# Patient Record
Sex: FEMALE | Race: WHITE | Employment: UNEMPLOYED | URBAN - METROPOLITAN AREA
[De-identification: names, ages, dates, MRNs, and addresses within clinical notes are randomized per-mention and may not be internally consistent; named-entity substitution may affect disease eponyms.]

---

## 2017-01-04 PROCEDURE — 86747 PARVOVIRUS ANTIBODY: CPT | Performed by: INTERNAL MEDICINE

## 2017-01-04 PROCEDURE — 86403 PARTICLE AGGLUT ANTBDY SCRN: CPT | Performed by: INTERNAL MEDICINE

## 2017-01-31 ENCOUNTER — HOSPITAL ENCOUNTER (OUTPATIENT)
Dept: MAMMOGRAPHY | Age: 42
Discharge: HOME | End: 2017-01-31
Attending: OBSTETRICS & GYNECOLOGY
Payer: COMMERCIAL

## 2017-01-31 DIAGNOSIS — Z12.39 BREAST SCREENING: ICD-10-CM

## 2017-01-31 PROCEDURE — 77067 SCR MAMMO BI INCL CAD: CPT

## 2017-02-21 ENCOUNTER — HOSPITAL ENCOUNTER (OUTPATIENT)
Dept: GENERAL RADIOLOGY | Age: 42
Discharge: HOME | End: 2017-02-21
Attending: INTERNAL MEDICINE
Payer: COMMERCIAL

## 2017-02-21 DIAGNOSIS — S09.90XA HEAD, FACE & NECK INJURY, INITIAL ENCOUNTER: ICD-10-CM

## 2017-02-21 DIAGNOSIS — S19.9XXA HEAD, FACE & NECK INJURY, INITIAL ENCOUNTER: ICD-10-CM

## 2017-02-21 DIAGNOSIS — S09.93XA HEAD, FACE & NECK INJURY, INITIAL ENCOUNTER: ICD-10-CM

## 2017-02-21 PROCEDURE — 70150 X-RAY EXAM OF FACIAL BONES: CPT

## 2017-03-07 PROBLEM — R11.0 NAUSEA: Status: ACTIVE | Noted: 2017-03-07

## 2017-03-07 PROBLEM — F07.81 POST CONCUSSION SYNDROME: Status: ACTIVE | Noted: 2017-03-07

## 2017-03-07 PROBLEM — H53.8 BLURRED VISION, BILATERAL: Status: ACTIVE | Noted: 2017-03-07

## 2017-06-15 ENCOUNTER — TELEPHONE (OUTPATIENT)
Dept: OBGYN CLINIC | Facility: CLINIC | Age: 42
End: 2017-06-15

## 2017-06-15 NOTE — TELEPHONE ENCOUNTER
Patient was last seen 11/02/2016. She called c/o vaginal itching, redness, and irritation. Denied any burning, frequency, or urgency with urination. She stated she had \"normal\" white discharge. She was using Zinc Oxide Cream for itching.   Denies any

## 2017-06-15 NOTE — TELEPHONE ENCOUNTER
Patient called, for about 5 days now she is having vaginal itching, redness around it and irritation. Wants to know if Dr. Lorraine Quarles can prescribe something for her or does she needs to come in.   Please advise

## 2017-09-18 PROCEDURE — 82533 TOTAL CORTISOL: CPT | Performed by: INTERNAL MEDICINE

## 2017-10-02 ENCOUNTER — TELEPHONE (OUTPATIENT)
Dept: OBGYN CLINIC | Facility: CLINIC | Age: 42
End: 2017-10-02

## 2017-10-02 RX ORDER — NORGESTIMATE AND ETHINYL ESTRADIOL
1 KIT DAILY
Qty: 84 TABLET | Refills: 0 | Status: SHIPPED | OUTPATIENT
Start: 2017-10-02 | End: 2017-10-06

## 2017-10-02 NOTE — TELEPHONE ENCOUNTER
Please refill the [facility] RX through [facility]  Please call PT when refill has been generated  PT has physical scheduled in November

## 2017-10-02 NOTE — TELEPHONE ENCOUNTER
Medication sent to pharmacy. Left detailed message via voicemail informing patient. To call back with any questions or concerns.

## 2017-10-06 ENCOUNTER — TELEPHONE (OUTPATIENT)
Dept: OBGYN CLINIC | Facility: CLINIC | Age: 42
End: 2017-10-06

## 2017-10-06 RX ORDER — NORGESTIMATE AND ETHINYL ESTRADIOL
1 KIT DAILY
Qty: 84 TABLET | Refills: 0 | Status: SHIPPED | OUTPATIENT
Start: 2017-10-06 | End: 2017-10-13

## 2017-10-06 NOTE — TELEPHONE ENCOUNTER
PT is calling and said she never received a call about the prescription being sent. And now states that she doesn't know where the medication was sent to. PT would like a call back from the RN and can be reached on her cell phone.

## 2017-10-06 NOTE — TELEPHONE ENCOUNTER
Patient stated that pharmacy never received her medication. Re-sent. To call with any further questions or concerns.

## 2017-10-12 ENCOUNTER — HOSPITAL ENCOUNTER (OUTPATIENT)
Dept: GENERAL RADIOLOGY | Age: 42
Discharge: HOME | End: 2017-10-12
Attending: INTERNAL MEDICINE
Payer: COMMERCIAL

## 2017-10-12 DIAGNOSIS — M25.561 CHRONIC PAIN OF RIGHT KNEE: ICD-10-CM

## 2017-10-12 DIAGNOSIS — G89.29 CHRONIC PAIN OF RIGHT KNEE: ICD-10-CM

## 2017-10-12 PROCEDURE — 73564 X-RAY EXAM KNEE 4 OR MORE: CPT | Performed by: INTERNAL MEDICINE

## 2017-10-13 ENCOUNTER — TELEPHONE (OUTPATIENT)
Dept: OBGYN CLINIC | Facility: CLINIC | Age: 42
End: 2017-10-13

## 2017-10-13 RX ORDER — NORGESTIMATE AND ETHINYL ESTRADIOL
1 KIT DAILY
Qty: 28 TABLET | Refills: 0 | Status: SHIPPED | OUTPATIENT
Start: 2017-10-13 | End: 2017-11-28

## 2017-10-13 RX ORDER — NORGESTIMATE AND ETHINYL ESTRADIOL
1 KIT DAILY
Qty: 28 TABLET | Refills: 0 | Status: SHIPPED | OUTPATIENT
Start: 2017-10-13 | End: 2017-10-13

## 2017-10-13 NOTE — TELEPHONE ENCOUNTER
PT appointment [facility] to  11/28  PT requesting one more refill prior to PT's appointment  Please call PT to confirm

## 2017-10-13 NOTE — TELEPHONE ENCOUNTER
Patient needs a call right back concerning TRI-LO-SPRINTEC 0.18/0.215/0.25 MG-25 MCG Oral Tab prescription.  Needs today

## 2017-10-13 NOTE — TELEPHONE ENCOUNTER
One month refill sent to [facility] per patient request.  Pending appt 11/9/17.

## 2017-10-29 ENCOUNTER — OFFICE VISIT (OUTPATIENT)
Dept: FAMILY MEDICINE CLINIC | Facility: CLINIC | Age: 42
End: 2017-10-29

## 2017-10-29 VITALS
RESPIRATION RATE: 18 BRPM | SYSTOLIC BLOOD PRESSURE: 110 MMHG | WEIGHT: 171 LBS | OXYGEN SATURATION: 98 % | TEMPERATURE: 98 F | HEART RATE: 68 BPM | BODY MASS INDEX: 27 KG/M2 | DIASTOLIC BLOOD PRESSURE: 60 MMHG

## 2017-10-29 DIAGNOSIS — B02.9 HERPES ZOSTER WITHOUT COMPLICATION: Primary | ICD-10-CM

## 2017-10-29 PROCEDURE — 99213 OFFICE O/P EST LOW 20 MIN: CPT | Performed by: NURSE PRACTITIONER

## 2017-10-29 RX ORDER — VALACYCLOVIR HYDROCHLORIDE 1 G/1
1 TABLET, FILM COATED ORAL EVERY 8 HOURS
Qty: 21 TABLET | Refills: 0 | Status: SHIPPED | OUTPATIENT
Start: 2017-10-29 | End: 2017-11-05

## 2017-10-29 NOTE — PROGRESS NOTES
CHIEF COMPLAINT:   Patient presents with:  Rash: pt c\o of rash, on abdomen,          HPI:   Shirlene Walker is a 38year old female who presents for evaluation of a rash. Per patient rash started in the past 2 days to right side of abdomen.    Rash has bee Comment: Miguel Angel; bruce 5 yrs  No date: D & C  2010: D&C AFTER DELIVERY      Comment: MISCARRIAGE  No date:   4/10/15: UPPER GI ENDOSCOPY PERFORMED      Comment: Lizeth Noland   Family History   Problem Relation Age of Onset   • Hypertension Mother breath sounds. No increased work of breathing. CARDIO: RRR without murmur  LYMPH: No cervical lymphadenopathy. ASSESSMENT AND PLAN:   Siva Forde is a 38year old female who presents for evaluation of a rash.  Findings are consistent with:    ASSES

## 2017-10-29 NOTE — PATIENT INSTRUCTIONS
Shingles  Shingles is a viral infection caused by the same virus as chicken pox. Anyone who has had chicken pox may get shingles later in life. The virus stays in the body, but remains dormant (asleep).  Shingles often occurs in older persons or persons w · Trim fingernails and try not to scratch. Scratching the sores may leave scars. · Stay home from work or school until all blisters have formed a crust and you are no longer contagious.   Follow-up care  Follow up with your healthcare provider or as direct

## 2017-11-20 RX ORDER — NORGESTIMATE AND ETHINYL ESTRADIOL 7DAYSX3 LO
1 KIT ORAL DAILY
Qty: 28 TABLET | Refills: 0 | OUTPATIENT
Start: 2017-11-20

## 2017-11-28 ENCOUNTER — OFFICE VISIT (OUTPATIENT)
Dept: OBGYN CLINIC | Facility: CLINIC | Age: 42
End: 2017-11-28

## 2017-11-28 VITALS
HEART RATE: 82 BPM | SYSTOLIC BLOOD PRESSURE: 110 MMHG | WEIGHT: 175 LBS | BODY MASS INDEX: 27.79 KG/M2 | HEIGHT: 66.5 IN | DIASTOLIC BLOOD PRESSURE: 82 MMHG

## 2017-11-28 DIAGNOSIS — R68.82 DECREASED LIBIDO: ICD-10-CM

## 2017-11-28 DIAGNOSIS — Z12.31 SCREENING MAMMOGRAM, ENCOUNTER FOR: ICD-10-CM

## 2017-11-28 DIAGNOSIS — Z01.419 ENCOUNTER FOR GYNECOLOGICAL EXAMINATION WITHOUT ABNORMAL FINDING: Primary | ICD-10-CM

## 2017-11-28 PROCEDURE — 99396 PREV VISIT EST AGE 40-64: CPT | Performed by: OBSTETRICS & GYNECOLOGY

## 2017-11-28 RX ORDER — NORETHINDRONE ACETATE AND ETHINYL ESTRADIOL 1; .02 MG/1; MG/1
1 TABLET ORAL DAILY
Qty: 3 PACKAGE | Refills: 3 | Status: SHIPPED | OUTPATIENT
Start: 2017-11-28 | End: 2018-01-30

## 2017-11-28 NOTE — PROGRESS NOTES
Patient ID:   Bi Fine  is a 38year old female         HPI:     Here for general gyn exam. Last seen 2016. New medical/surgical hx:  None. Had concussion due to fall in 2016 and took months to recover from.       Patient's la Comment: MISCARRIAGE  No date:   4/10/15: UPPER GI ENDOSCOPY PERFORMED      Comment: Miguel Angel    Current Outpatient Prescriptions on File Prior to Visit:  Vitamin C 500 MG Oral Tab Take 500 mg by mouth daily.  Disp:  Rfl:    Probiotic Product (PROBIO Visit:  Signed Prescriptions Disp Refills    Norethindrone Acet-Ethinyl Est (MICROGESTIN 1/20) 1-20 MG-MCG Oral Tab 3 Package 3      Sig: Take 1 tablet by mouth daily.            Imaging & Consults:  VELMA SCREENING BILMARY (CPT=77067)    NOTES:  Will change OC

## 2017-12-08 ENCOUNTER — TELEPHONE (OUTPATIENT)
Dept: OBGYN CLINIC | Facility: CLINIC | Age: 42
End: 2017-12-08

## 2018-01-29 ENCOUNTER — TELEPHONE (OUTPATIENT)
Dept: OBGYN CLINIC | Facility: CLINIC | Age: 43
End: 2018-01-29

## 2018-01-29 NOTE — TELEPHONE ENCOUNTER
Patient states that she was tying different birth control pills and now would like to switch back to the one she was first taking.  Please call her

## 2018-01-29 NOTE — TELEPHONE ENCOUNTER
Pt would like to go back to Tri Lo Sprintec. Pt has not noticed any change with Microgestin. Refills to be sent to [facility] on file. Routed to Dr. Abhijit Garcia. Please advise if OK to order Tri Lo Sprintec until annual due.

## 2018-01-30 RX ORDER — NORGESTIMATE AND ETHINYL ESTRADIOL 0.25-0.035
1 KIT ORAL DAILY
Qty: 3 PACKAGE | Refills: 3 | Status: SHIPPED | OUTPATIENT
Start: 2018-01-30 | End: 2018-05-23 | Stop reason: ALTCHOICE

## 2018-04-17 ENCOUNTER — HOSPITAL ENCOUNTER (OUTPATIENT)
Dept: MAMMOGRAPHY | Age: 43
Discharge: HOME | End: 2018-04-17
Attending: OBSTETRICS & GYNECOLOGY
Payer: COMMERCIAL

## 2018-04-17 DIAGNOSIS — Z12.31 SCREENING MAMMOGRAM, ENCOUNTER FOR: ICD-10-CM

## 2018-04-17 PROCEDURE — 77067 SCR MAMMO BI INCL CAD: CPT | Performed by: OBSTETRICS & GYNECOLOGY

## 2018-05-23 ENCOUNTER — OFFICE VISIT (OUTPATIENT)
Dept: FAMILY MEDICINE CLINIC | Facility: CLINIC | Age: 43
End: 2018-05-23

## 2018-05-23 VITALS
BODY MASS INDEX: 29.41 KG/M2 | TEMPERATURE: 98 F | HEIGHT: 66 IN | RESPIRATION RATE: 16 BRPM | HEART RATE: 94 BPM | DIASTOLIC BLOOD PRESSURE: 66 MMHG | WEIGHT: 183 LBS | SYSTOLIC BLOOD PRESSURE: 100 MMHG | OXYGEN SATURATION: 97 %

## 2018-05-23 DIAGNOSIS — J02.9 SORE THROAT: Primary | ICD-10-CM

## 2018-05-23 PROCEDURE — 99213 OFFICE O/P EST LOW 20 MIN: CPT | Performed by: PHYSICIAN ASSISTANT

## 2018-05-23 PROCEDURE — 87880 STREP A ASSAY W/OPTIC: CPT | Performed by: PHYSICIAN ASSISTANT

## 2018-05-23 NOTE — PROGRESS NOTES
CHIEF COMPLAINT:   Patient presents with:  Sore Throat: 3 days. HPI:   Dasia Worthy is a 38year old female presents to clinic with symptoms of sore throat for 3 days. Admits to runny nose. Ear pressure. Mild headache.  Low grade temp-100.2. +fatigue 16   Ht 66\"   Wt 183 lb   LMP 05/18/2018   SpO2 97%   BMI 29.54 kg/m²   GENERAL: well developed, well nourished,in no apparent distress  SKIN: no rashes,no suspicious lesions  HEAD: atraumatic, normocephalic  EYES: conjunctiva clear  EARS: TM's clear, non

## 2018-12-06 ENCOUNTER — OFFICE VISIT (OUTPATIENT)
Dept: FAMILY MEDICINE CLINIC | Facility: CLINIC | Age: 43
End: 2018-12-06
Payer: COMMERCIAL

## 2018-12-06 VITALS
RESPIRATION RATE: 18 BRPM | SYSTOLIC BLOOD PRESSURE: 106 MMHG | BODY MASS INDEX: 29.89 KG/M2 | OXYGEN SATURATION: 98 % | HEART RATE: 86 BPM | TEMPERATURE: 98 F | WEIGHT: 186 LBS | HEIGHT: 66 IN | DIASTOLIC BLOOD PRESSURE: 70 MMHG

## 2018-12-06 DIAGNOSIS — J02.9 SORE THROAT: ICD-10-CM

## 2018-12-06 DIAGNOSIS — J06.9 VIRAL UPPER RESPIRATORY TRACT INFECTION: Primary | ICD-10-CM

## 2018-12-06 PROCEDURE — 87880 STREP A ASSAY W/OPTIC: CPT | Performed by: NURSE PRACTITIONER

## 2018-12-06 PROCEDURE — 99213 OFFICE O/P EST LOW 20 MIN: CPT | Performed by: NURSE PRACTITIONER

## 2018-12-06 NOTE — PROGRESS NOTES
CHIEF COMPLAINT:   Patient presents with:  Cough: Burning cough, sore throat, post nasal drip X 6 days     HPI:   Rosi Cornelius is a 37year old female presents to clinic with complaint of sore throat. Patient has had for 6 days.  Patient reports followi GENERAL HEALTH: feels well otherwise, good appetite  SKIN: denies any unusual skin lesions or rashes  HEENT: denies ear pain, See HPI  RESPIRATORY: denies shortness of breath or wheezing  CARDIOVASCULAR: denies chest pain or palpitations   GI: denies vomit · Common colds are caused by viruses which are not eradicated with antibiotics  · Cold remedies are to relieve symptoms and prevent complications rather than cure infection  · Rest and increased oral fluid intake is advised  · Increase humidity of the air You have a viral upper respiratory illness (URI), which is another term for the common cold. This illness is contagious during the first few days. It is spread through the air by coughing and sneezing.  It may also be spread by direct contact (touching the · Cough with lots of colored sputum (mucus)  · Severe headache; face, neck, or ear pain  · Difficulty swallowing due to throat pain  · Fever of 100.4°F (38°C) or higher, or as directed by your healthcare provider  Call 911  Call 911 if any of these occur: · For sore throats caused by allergies, try antihistamines to block the allergic reaction. · Remember: unless a sore throat is caused by a bacterial infection, antibiotics won’t help you.   Prevent future sore throats  Prevention tips include the following

## 2019-01-14 ENCOUNTER — OFFICE VISIT (OUTPATIENT)
Dept: OBGYN CLINIC | Facility: CLINIC | Age: 44
End: 2019-01-14
Payer: COMMERCIAL

## 2019-01-14 VITALS
BODY MASS INDEX: 29.16 KG/M2 | HEART RATE: 83 BPM | DIASTOLIC BLOOD PRESSURE: 70 MMHG | WEIGHT: 183.63 LBS | HEIGHT: 66.5 IN | SYSTOLIC BLOOD PRESSURE: 96 MMHG

## 2019-01-14 DIAGNOSIS — Z12.4 PAPANICOLAOU SMEAR FOR CERVICAL CANCER SCREENING: ICD-10-CM

## 2019-01-14 DIAGNOSIS — Z12.31 SCREENING MAMMOGRAM, ENCOUNTER FOR: ICD-10-CM

## 2019-01-14 DIAGNOSIS — Z01.419 ENCOUNTER FOR GYNECOLOGICAL EXAMINATION WITHOUT ABNORMAL FINDING: Primary | ICD-10-CM

## 2019-01-14 DIAGNOSIS — Z80.3 FAMILY HISTORY OF BREAST CANCER IN FIRST DEGREE RELATIVE: ICD-10-CM

## 2019-01-14 PROCEDURE — 99396 PREV VISIT EST AGE 40-64: CPT | Performed by: OBSTETRICS & GYNECOLOGY

## 2019-01-14 PROCEDURE — 88175 CYTOPATH C/V AUTO FLUID REDO: CPT | Performed by: OBSTETRICS & GYNECOLOGY

## 2019-01-14 NOTE — PROGRESS NOTES
Patient ID:   Obdulio Bonner  is a 37year old female         HPI:     Here for general gyn exam. Last seen 2017. New medical/surgical hx:  None. Patient's last menstrual period was 2018 (approximate). Menses:   Hx Prior Abnormal per nostril daily as needed Disp:  Rfl: 0   Loratadine (CLARITIN) 10 MG Oral Cap Take  by mouth.  Disp:  Rfl:    Cholecalciferol (VITAMIN D) 2000 UNITS Oral Cap 1 CAPSULE DAILY Disp:  Rfl:    Multiple Vitamin (DAILY MULTIVITAMIN OR) daily Disp:  Rfl:    Ome

## 2019-01-31 ENCOUNTER — OFFICE VISIT (OUTPATIENT)
Dept: FAMILY MEDICINE CLINIC | Facility: CLINIC | Age: 44
End: 2019-01-31
Payer: COMMERCIAL

## 2019-01-31 VITALS
BODY MASS INDEX: 28.88 KG/M2 | HEIGHT: 67 IN | OXYGEN SATURATION: 98 % | HEART RATE: 88 BPM | RESPIRATION RATE: 16 BRPM | TEMPERATURE: 98 F | SYSTOLIC BLOOD PRESSURE: 118 MMHG | DIASTOLIC BLOOD PRESSURE: 76 MMHG | WEIGHT: 184 LBS

## 2019-01-31 DIAGNOSIS — N30.01 ACUTE CYSTITIS WITH HEMATURIA: ICD-10-CM

## 2019-01-31 DIAGNOSIS — R39.9 UTI SYMPTOMS: Primary | ICD-10-CM

## 2019-01-31 LAB
BILIRUBIN: NEGATIVE
GLUCOSE (URINE DIPSTICK): NEGATIVE MG/DL
KETONES (URINE DIPSTICK): NEGATIVE MG/DL
MULTISTIX LOT#: ABNORMAL NUMERIC
NITRITE, URINE: NEGATIVE
PH, URINE: 7 (ref 4.5–8)
PROTEIN (URINE DIPSTICK): 100 MG/DL
SPECIFIC GRAVITY: 1.02 (ref 1–1.03)
URINE-COLOR: YELLOW
UROBILINOGEN,SEMI-QN: 0.2 MG/DL (ref 0–1.9)

## 2019-01-31 PROCEDURE — 87186 SC STD MICRODIL/AGAR DIL: CPT | Performed by: NURSE PRACTITIONER

## 2019-01-31 PROCEDURE — 81003 URINALYSIS AUTO W/O SCOPE: CPT | Performed by: NURSE PRACTITIONER

## 2019-01-31 PROCEDURE — 99213 OFFICE O/P EST LOW 20 MIN: CPT | Performed by: NURSE PRACTITIONER

## 2019-01-31 PROCEDURE — 87088 URINE BACTERIA CULTURE: CPT | Performed by: NURSE PRACTITIONER

## 2019-01-31 PROCEDURE — 87086 URINE CULTURE/COLONY COUNT: CPT | Performed by: NURSE PRACTITIONER

## 2019-01-31 RX ORDER — NITROFURANTOIN 25; 75 MG/1; MG/1
100 CAPSULE ORAL 2 TIMES DAILY
Qty: 14 CAPSULE | Refills: 0 | Status: SHIPPED | OUTPATIENT
Start: 2019-01-31 | End: 2019-02-07

## 2019-01-31 NOTE — PROGRESS NOTES
CHIEF COMPLAINT:   Patient presents with:  Urinary Symptoms (urologic): cloudy urine and burning with urination, started during the night      HPI:   Rosi Cornelius is a 37year old female who presents with symptoms of UTI.  Complaining of urinary frequen NEURO: no headaches.     EXAM:   /76   Pulse 88   Temp 97.9 °F (36.6 °C) (Oral)   Resp 16   Ht 67\"   Wt 184 lb   SpO2 98%   BMI 28.82 kg/m²   GENERAL: well developed, well nourished,in no apparent distress  CARDIO: RRR, no murmurs  LUNGS: clear to au Urine is normally doesn't have any bacteria in it. But bacteria can get into the urinary tract from the skin around the rectum. Or they can travel in the blood from elsewhere in the body.  Once they are in your urinary tract, they can cause infection in the · Procedures such as having a catheter inserted  · Older age  · Not emptying your bladder. This can allow bacteria a chance to grow in your urine.   · Dehydration  · Constipation  · Sex  · Use of a diaphragm for birth control   Treatment  Bladder infections · Urinate right after intercourse to flush out your bladder. · If you use birth control pills and have frequent bladder infections, discuss it with your doctor.   Follow-up care  Call your healthcare provider if all symptoms are not gone after 3 days of tr

## 2019-02-07 PROCEDURE — 87086 URINE CULTURE/COLONY COUNT: CPT | Performed by: INTERNAL MEDICINE

## 2019-03-21 PROBLEM — M54.42 ACUTE LEFT-SIDED LOW BACK PAIN WITH LEFT-SIDED SCIATICA: Status: ACTIVE | Noted: 2019-03-21

## 2019-03-21 PROBLEM — R11.0 NAUSEA: Status: RESOLVED | Noted: 2017-03-07 | Resolved: 2019-03-21

## 2019-03-21 PROBLEM — F07.81 POST CONCUSSION SYNDROME: Status: RESOLVED | Noted: 2017-03-07 | Resolved: 2019-03-21

## 2019-05-07 ENCOUNTER — HOSPITAL ENCOUNTER (OUTPATIENT)
Dept: MAMMOGRAPHY | Age: 44
Discharge: HOME | End: 2019-05-07
Attending: OBSTETRICS & GYNECOLOGY
Payer: COMMERCIAL

## 2019-05-07 DIAGNOSIS — Z12.31 SCREENING MAMMOGRAM, ENCOUNTER FOR: ICD-10-CM

## 2019-05-07 DIAGNOSIS — Z80.3 FAMILY HISTORY OF BREAST CANCER IN FIRST DEGREE RELATIVE: ICD-10-CM

## 2019-05-07 PROCEDURE — 77063 BREAST TOMOSYNTHESIS BI: CPT | Performed by: OBSTETRICS & GYNECOLOGY

## 2019-05-07 PROCEDURE — 77067 SCR MAMMO BI INCL CAD: CPT | Performed by: OBSTETRICS & GYNECOLOGY

## 2019-05-30 PROCEDURE — 86812 HLA TYPING A B OR C: CPT | Performed by: INTERNAL MEDICINE

## 2019-09-30 PROBLEM — M79.645 FINGER PAIN, LEFT: Status: ACTIVE | Noted: 2019-09-30

## 2019-10-01 ENCOUNTER — HOSPITAL ENCOUNTER (OUTPATIENT)
Dept: GENERAL RADIOLOGY | Age: 44
Discharge: HOME | End: 2019-10-01
Attending: INTERNAL MEDICINE
Payer: COMMERCIAL

## 2019-10-01 DIAGNOSIS — M79.645 FINGER PAIN, LEFT: ICD-10-CM

## 2019-10-01 PROCEDURE — 73140 X-RAY EXAM OF FINGER(S): CPT | Performed by: INTERNAL MEDICINE

## 2019-10-01 PROCEDURE — 72202 X-RAY EXAM SI JOINTS 3/> VWS: CPT | Performed by: INTERNAL MEDICINE

## 2019-10-03 PROBLEM — S69.92XA INJURY OF COLLATERAL LIGAMENT OF FINGER OF LEFT HAND, INITIAL ENCOUNTER: Status: ACTIVE | Noted: 2019-10-03

## 2020-01-23 ENCOUNTER — OFFICE VISIT (OUTPATIENT)
Dept: FAMILY MEDICINE CLINIC | Facility: CLINIC | Age: 45
End: 2020-01-23
Payer: COMMERCIAL

## 2020-01-23 VITALS
BODY MASS INDEX: 30.17 KG/M2 | HEART RATE: 109 BPM | HEIGHT: 67 IN | RESPIRATION RATE: 18 BRPM | WEIGHT: 192.19 LBS | TEMPERATURE: 99 F | DIASTOLIC BLOOD PRESSURE: 70 MMHG | SYSTOLIC BLOOD PRESSURE: 110 MMHG | OXYGEN SATURATION: 99 %

## 2020-01-23 DIAGNOSIS — J06.9 VIRAL URI WITH COUGH: ICD-10-CM

## 2020-01-23 DIAGNOSIS — R68.89 FLU-LIKE SYMPTOMS: Primary | ICD-10-CM

## 2020-01-23 LAB
OPERATOR ID: NORMAL
POCT INFLUENZA A: NEGATIVE
POCT INFLUENZA B: NEGATIVE

## 2020-01-23 PROCEDURE — 87502 INFLUENZA DNA AMP PROBE: CPT | Performed by: NURSE PRACTITIONER

## 2020-01-23 PROCEDURE — 99213 OFFICE O/P EST LOW 20 MIN: CPT | Performed by: NURSE PRACTITIONER

## 2020-01-23 NOTE — PROGRESS NOTES
CHIEF COMPLAINT:   Patient presents with:  Post Nasal Drip: chills, post nasal drip, cough x yesterday       HPI:   Ame Cerda is a 53year old female who presents for upper respiratory symptoms for  1 days. Patient reports chills, cough, PND.  Symptoms SKIN: no rashes or abnormal skin lesions  HEENT: See HPI  LUNGS: See HPI  CARDIOVASCULAR: denies chest pain or palpitations   GI: denies N/V/C or abdominal pain      EXAM:   /70 (BP Location: Right arm, Patient Position: Sitting, Cuff Size: adult) A viral illness may cause a number of symptoms such as fever. Other symptoms depend on the part of the body that the virus affects.  If it settles in your nose, throat, and lungs, it may cause cough, sore throat, congestion, runny nose, headache, earache an · Your appetite may be poor, so a light diet is fine. Avoid dehydration by drinking 8 to 12, 8-ounce glasses of fluids each day.  This may include water; orange juice; lemonade; apple, grape, and cranberry juice; clear fruit drinks; electrolyte replacement © 6216-6560 [employer] [address] [facility], [facility] [place]. All rights reserved. This information is not intended as a substitute for professional medical care. Always follow your healthcare professional's instructions.

## 2020-01-23 NOTE — PATIENT INSTRUCTIONS
Viral Syndrome (Adult)  A viral illness may cause a number of symptoms such as fever. Other symptoms depend on the part of the body that the virus affects.  If it settles in your nose, throat, and lungs, it may cause cough, sore throat, congestion, runny · Your appetite may be poor, so a light diet is fine. Avoid dehydration by drinking 8 to 12, 8-ounce glasses of fluids each day.  This may include water; orange juice; lemonade; apple, grape, and cranberry juice; clear fruit drinks; electrolyte replacement © 2156-0562 [employer] [address] [facility], [facility] [place]. All rights reserved. This information is not intended as a substitute for professional medical care. Always follow your healthcare professional's instructions.

## 2020-02-14 ENCOUNTER — OFFICE VISIT (OUTPATIENT)
Dept: OBGYN CLINIC | Facility: CLINIC | Age: 45
End: 2020-02-14
Payer: COMMERCIAL

## 2020-02-14 VITALS
WEIGHT: 192 LBS | SYSTOLIC BLOOD PRESSURE: 118 MMHG | HEIGHT: 67 IN | HEART RATE: 83 BPM | BODY MASS INDEX: 30.13 KG/M2 | DIASTOLIC BLOOD PRESSURE: 74 MMHG

## 2020-02-14 DIAGNOSIS — N64.4 CYCLICAL BREAST PAIN: Primary | ICD-10-CM

## 2020-02-14 PROCEDURE — 99213 OFFICE O/P EST LOW 20 MIN: CPT | Performed by: OBSTETRICS & GYNECOLOGY

## 2020-03-13 ENCOUNTER — TELEPHONE (OUTPATIENT)
Dept: OBGYN CLINIC | Facility: CLINIC | Age: 45
End: 2020-03-13

## 2020-03-13 DIAGNOSIS — N64.4 CYCLICAL BREAST PAIN: Primary | ICD-10-CM

## 2020-03-13 NOTE — TELEPHONE ENCOUNTER
Diagnostic mammogram ordered for cyclical breast pain. Patient notified. Verbalized understanding. No further questions or concerns.

## 2020-03-13 NOTE — TELEPHONE ENCOUNTER
Patient needs different type of mammo ordered. She is having breast pain. This is not a routine mammo.  Please call her with when complete

## 2020-04-30 ENCOUNTER — HOSPITAL ENCOUNTER (OUTPATIENT)
Dept: MAMMOGRAPHY | Age: 45
Discharge: HOME | End: 2020-04-30
Attending: OBSTETRICS & GYNECOLOGY
Payer: COMMERCIAL

## 2020-04-30 DIAGNOSIS — N64.4 CYCLICAL BREAST PAIN: ICD-10-CM

## 2020-04-30 PROCEDURE — 77066 DX MAMMO INCL CAD BI: CPT | Performed by: OBSTETRICS & GYNECOLOGY

## 2020-04-30 PROCEDURE — 77062 BREAST TOMOSYNTHESIS BI: CPT | Performed by: OBSTETRICS & GYNECOLOGY

## 2020-06-13 ENCOUNTER — OFFICE VISIT (OUTPATIENT)
Dept: OBGYN CLINIC | Facility: CLINIC | Age: 45
End: 2020-06-13
Payer: COMMERCIAL

## 2020-06-13 VITALS
SYSTOLIC BLOOD PRESSURE: 118 MMHG | BODY MASS INDEX: 29.89 KG/M2 | HEIGHT: 66.54 IN | WEIGHT: 188.19 LBS | HEART RATE: 77 BPM | DIASTOLIC BLOOD PRESSURE: 68 MMHG

## 2020-06-13 DIAGNOSIS — Z01.419 WELL FEMALE EXAM WITH ROUTINE GYNECOLOGICAL EXAM: Primary | ICD-10-CM

## 2020-06-13 PROCEDURE — 99396 PREV VISIT EST AGE 40-64: CPT | Performed by: OBSTETRICS & GYNECOLOGY

## 2020-06-13 RX ORDER — OMEPRAZOLE 20 MG/1
TABLET, DELAYED RELEASE ORAL
COMMUNITY
Start: 2020-03-23 | End: 2020-06-30

## 2020-06-13 RX ORDER — CETIRIZINE HYDROCHLORIDE 10 MG/1
10 TABLET ORAL DAILY
COMMUNITY

## 2020-06-13 NOTE — PROGRESS NOTES
Annual  No C/O  Cycles mostly regular, had one 39 days. Discussed perimenopause, irregular cycles, will watch  Breast pain resolved, ? Mastitis  Negative mammogram 3/2020  She was sick in January, then daughter was ill. ? About Covid antibody testing. ?  [facility] [employer]

## 2020-06-30 PROBLEM — M54.42 ACUTE LEFT-SIDED LOW BACK PAIN WITH LEFT-SIDED SCIATICA: Status: RESOLVED | Noted: 2019-03-21 | Resolved: 2020-06-30

## 2020-06-30 PROBLEM — M79.645 FINGER PAIN, LEFT: Status: RESOLVED | Noted: 2019-09-30 | Resolved: 2020-06-30

## 2020-06-30 PROBLEM — Z80.8 FAMILY HISTORY OF THYROID CANCER: Status: ACTIVE | Noted: 2019-06-03

## 2020-06-30 PROBLEM — Z82.49 FAMILY HISTORY OF ARRHYTHMIA: Status: ACTIVE | Noted: 2017-06-02

## 2020-06-30 PROBLEM — K21.9 GASTROESOPHAGEAL REFLUX DISEASE, ESOPHAGITIS PRESENCE NOT SPECIFIED: Status: ACTIVE | Noted: 2020-06-30

## 2020-06-30 PROBLEM — S69.92XA INJURY OF COLLATERAL LIGAMENT OF FINGER OF LEFT HAND, INITIAL ENCOUNTER: Status: RESOLVED | Noted: 2019-10-03 | Resolved: 2020-06-30

## 2020-12-14 ENCOUNTER — OFFICE VISIT (OUTPATIENT)
Dept: OBGYN CLINIC | Facility: CLINIC | Age: 45
End: 2020-12-14
Payer: COMMERCIAL

## 2020-12-14 VITALS
BODY MASS INDEX: 29.35 KG/M2 | HEART RATE: 74 BPM | HEIGHT: 67 IN | SYSTOLIC BLOOD PRESSURE: 134 MMHG | WEIGHT: 187 LBS | RESPIRATION RATE: 14 BRPM | DIASTOLIC BLOOD PRESSURE: 82 MMHG

## 2020-12-14 DIAGNOSIS — N64.9 LESION OF LEFT NIPPLE: Primary | ICD-10-CM

## 2020-12-14 PROCEDURE — 3008F BODY MASS INDEX DOCD: CPT | Performed by: OBSTETRICS & GYNECOLOGY

## 2020-12-14 PROCEDURE — 3079F DIAST BP 80-89 MM HG: CPT | Performed by: OBSTETRICS & GYNECOLOGY

## 2020-12-14 PROCEDURE — 3075F SYST BP GE 130 - 139MM HG: CPT | Performed by: OBSTETRICS & GYNECOLOGY

## 2020-12-14 PROCEDURE — 99213 OFFICE O/P EST LOW 20 MIN: CPT | Performed by: OBSTETRICS & GYNECOLOGY

## 2020-12-14 NOTE — PROGRESS NOTES
Here for left breast lesion on nipple  Over last two weeks  Menses irregular  Discussed perimenopause  Occasional heart palpitation  Left nipple lesion, had previously  Resolved on its own    ROS: No Cardiac, Respiratory, GI,  or Neurological symptoms.

## 2021-01-05 ENCOUNTER — TELEPHONE (OUTPATIENT)
Dept: SURGERY | Facility: CLINIC | Age: 46
End: 2021-01-05

## 2021-01-05 ENCOUNTER — OFFICE VISIT (OUTPATIENT)
Dept: SURGERY | Facility: CLINIC | Age: 46
End: 2021-01-05
Payer: COMMERCIAL

## 2021-01-05 VITALS
DIASTOLIC BLOOD PRESSURE: 71 MMHG | OXYGEN SATURATION: 98 % | RESPIRATION RATE: 16 BRPM | BODY MASS INDEX: 29.35 KG/M2 | HEART RATE: 82 BPM | SYSTOLIC BLOOD PRESSURE: 114 MMHG | HEIGHT: 67 IN | WEIGHT: 187 LBS

## 2021-01-05 DIAGNOSIS — N64.4 BREAST PAIN, LEFT: ICD-10-CM

## 2021-01-05 DIAGNOSIS — N63.20 LEFT BREAST MASS: Primary | ICD-10-CM

## 2021-01-05 PROCEDURE — 99245 OFF/OP CONSLTJ NEW/EST HI 55: CPT | Performed by: SURGERY

## 2021-01-05 PROCEDURE — 3074F SYST BP LT 130 MM HG: CPT | Performed by: SURGERY

## 2021-01-05 PROCEDURE — 3078F DIAST BP <80 MM HG: CPT | Performed by: SURGERY

## 2021-01-05 PROCEDURE — 3008F BODY MASS INDEX DOCD: CPT | Performed by: SURGERY

## 2021-01-05 NOTE — TELEPHONE ENCOUNTER
Returned pt phone call regarding her mammogram order  Informed pt that the order is for a diagnostic mammogram so they can do an ultrasound if needed. There is also direction from Dr. Jamil Mccord on the ultrasound order to ultrasound a specific area.   Pt verbal

## 2021-01-11 ENCOUNTER — HOSPITAL ENCOUNTER (OUTPATIENT)
Dept: MAMMOGRAPHY | Age: 46
Discharge: HOME | End: 2021-01-11
Attending: SURGERY
Payer: COMMERCIAL

## 2021-01-11 ENCOUNTER — HOSPITAL ENCOUNTER (OUTPATIENT)
Dept: ULTRASOUND IMAGING | Age: 46
Discharge: HOME | End: 2021-01-11
Attending: SURGERY
Payer: COMMERCIAL

## 2021-01-11 DIAGNOSIS — N63.20 LEFT BREAST MASS: ICD-10-CM

## 2021-01-11 DIAGNOSIS — N64.4 BREAST PAIN, LEFT: ICD-10-CM

## 2021-01-11 PROCEDURE — 76642 ULTRASOUND BREAST LIMITED: CPT | Performed by: SURGERY

## 2021-01-11 PROCEDURE — 77062 BREAST TOMOSYNTHESIS BI: CPT | Performed by: SURGERY

## 2021-01-11 PROCEDURE — 77066 DX MAMMO INCL CAD BI: CPT | Performed by: SURGERY

## 2021-01-11 NOTE — PROGRESS NOTES
Breast Surgery New Patient Consultation    This is the first visit for this 50year old woman, referred by Dr. Jasmine Mccall, who presents for evaluation of multiple left breast concerns.     History of Present Illness:   Ms. Lebron Hartley is a 50year old woman wh D&C AFTER DELIVERY  2010    MISCARRIAGE   •      • UPPER GI ENDOSCOPY PERFORMED  4/10/15    Miguel Angel       Gynecological History:  Pt is a   Pt was 26years old at time of first pregnancy.     She has cumulative breastfeeding history of 14 months History:    Alcohol use Yes 3.0 standard drinks/week 1 Glasses of wine, 1 Cans of beer, 1 Shots of liquor per week   Comment: social           Smoking status: Never Smoker   Smokeless tobacco: Never Used   The patient is . She has 1 child.   She is denies easily bruising or bleeding or persistent swollen glands or lymph nodes. Musculoskeletal:  The patient denies muscle aches/pain, joint pain, stiff joints, neck pain, +back pain or bone pain.     Neuropsychiatric:  There is no history of migraines masses in the breast. The axillary tail is normal.  Left breast:   The skin, nipple, and areola appear normal. There is no skin dimpling with movement of the pectoralis. There is no nipple retraction. No nipple discharge can be elicited.  The parenchyma is subsequent plan will be dictated accordingly. She was given ample opportunity for questions and those questions were answered to her satisfaction. She has been  encouraged to contact the office with any questions or concerns prior to her next appointment.

## 2021-11-18 ENCOUNTER — OFFICE VISIT (OUTPATIENT)
Dept: OBGYN CLINIC | Facility: CLINIC | Age: 46
End: 2021-11-18
Payer: COMMERCIAL

## 2021-11-18 VITALS
HEIGHT: 66 IN | SYSTOLIC BLOOD PRESSURE: 110 MMHG | DIASTOLIC BLOOD PRESSURE: 70 MMHG | BODY MASS INDEX: 30.67 KG/M2 | WEIGHT: 190.81 LBS

## 2021-11-18 DIAGNOSIS — Z12.39 ENCOUNTER FOR SCREENING FOR MALIGNANT NEOPLASM OF BREAST, UNSPECIFIED SCREENING MODALITY: ICD-10-CM

## 2021-11-18 DIAGNOSIS — Z01.419 WELL WOMAN EXAM WITH ROUTINE GYNECOLOGICAL EXAM: Primary | ICD-10-CM

## 2021-11-18 DIAGNOSIS — Z82.49 FAMILY HISTORY OF ARRHYTHMIA: ICD-10-CM

## 2021-11-18 DIAGNOSIS — Z12.4 SCREENING FOR MALIGNANT NEOPLASM OF CERVIX: ICD-10-CM

## 2021-11-18 PROCEDURE — 3074F SYST BP LT 130 MM HG: CPT | Performed by: OBSTETRICS & GYNECOLOGY

## 2021-11-18 PROCEDURE — 3008F BODY MASS INDEX DOCD: CPT | Performed by: OBSTETRICS & GYNECOLOGY

## 2021-11-18 PROCEDURE — 99396 PREV VISIT EST AGE 40-64: CPT | Performed by: OBSTETRICS & GYNECOLOGY

## 2021-11-18 PROCEDURE — 3078F DIAST BP <80 MM HG: CPT | Performed by: OBSTETRICS & GYNECOLOGY

## 2021-11-18 PROCEDURE — 87624 HPV HI-RISK TYP POOLED RSLT: CPT | Performed by: OBSTETRICS & GYNECOLOGY

## 2021-11-18 PROCEDURE — 88175 CYTOPATH C/V AUTO FLUID REDO: CPT | Performed by: OBSTETRICS & GYNECOLOGY

## 2021-11-18 NOTE — PROGRESS NOTES
GYN H&P     2021  11:56 AM    CC: Patient is here for annual exam    HPI: patient is a 48year old  here for her annual exam    She reported irreguar cycles last visit with Dr. Deepa Leonardo.    Saw Dr. Lore Sweeney for breast lesion last year, had US and wor Fluticasone Propionate (FLONASE) 50 MCG/ACT Nasal Suspension, 2 sprays per nostril daily as needed, Disp: , Rfl: 0  Cholecalciferol (VITAMIN D) 2000 UNITS Oral Cap, 1 CAPSULE DAILY, Disp: , Rfl:   Multiple Vitamin (DAILY MULTIVITAMIN OR), daily, Disp: , soft. There is no mass. Tenderness: There is no abdominal tenderness. There is no guarding or rebound. Hernia: No hernia is present. Genitourinary:     Labia:         Right: No rash, tenderness, lesion or injury.          Left: No rash, tenderne

## 2022-03-01 ENCOUNTER — HOSPITAL ENCOUNTER (OUTPATIENT)
Dept: MAMMOGRAPHY | Age: 47
Discharge: HOME | End: 2022-03-01
Attending: OBSTETRICS & GYNECOLOGY
Payer: COMMERCIAL

## 2022-03-01 DIAGNOSIS — Z12.39 ENCOUNTER FOR SCREENING FOR MALIGNANT NEOPLASM OF BREAST, UNSPECIFIED SCREENING MODALITY: ICD-10-CM

## 2022-03-01 PROCEDURE — 77067 SCR MAMMO BI INCL CAD: CPT | Performed by: OBSTETRICS & GYNECOLOGY

## 2022-03-01 PROCEDURE — 77063 BREAST TOMOSYNTHESIS BI: CPT | Performed by: OBSTETRICS & GYNECOLOGY

## 2022-12-14 ENCOUNTER — OFFICE VISIT (OUTPATIENT)
Dept: OBGYN CLINIC | Facility: CLINIC | Age: 47
End: 2022-12-14
Payer: COMMERCIAL

## 2022-12-14 VITALS
SYSTOLIC BLOOD PRESSURE: 112 MMHG | DIASTOLIC BLOOD PRESSURE: 78 MMHG | WEIGHT: 195.13 LBS | BODY MASS INDEX: 31 KG/M2 | HEART RATE: 103 BPM

## 2022-12-14 DIAGNOSIS — Z12.31 ENCOUNTER FOR SCREENING MAMMOGRAM FOR MALIGNANT NEOPLASM OF BREAST: ICD-10-CM

## 2022-12-14 DIAGNOSIS — Z80.3 FAMILY HISTORY OF BREAST CANCER IN MOTHER: ICD-10-CM

## 2022-12-14 DIAGNOSIS — Z12.4 SCREENING FOR MALIGNANT NEOPLASM OF CERVIX: ICD-10-CM

## 2022-12-14 DIAGNOSIS — Z01.419 WELL WOMAN EXAM WITH ROUTINE GYNECOLOGICAL EXAM: Primary | ICD-10-CM

## 2022-12-14 PROCEDURE — 99396 PREV VISIT EST AGE 40-64: CPT | Performed by: OBSTETRICS & GYNECOLOGY

## 2022-12-14 PROCEDURE — 3078F DIAST BP <80 MM HG: CPT | Performed by: OBSTETRICS & GYNECOLOGY

## 2022-12-14 PROCEDURE — 3074F SYST BP LT 130 MM HG: CPT | Performed by: OBSTETRICS & GYNECOLOGY

## 2023-03-08 ENCOUNTER — HOSPITAL ENCOUNTER (OUTPATIENT)
Dept: MAMMOGRAPHY | Age: 48
Discharge: HOME | End: 2023-03-08
Attending: OBSTETRICS & GYNECOLOGY
Payer: COMMERCIAL

## 2023-03-08 DIAGNOSIS — Z12.31 ENCOUNTER FOR SCREENING MAMMOGRAM FOR MALIGNANT NEOPLASM OF BREAST: ICD-10-CM

## 2023-03-08 PROCEDURE — 77063 BREAST TOMOSYNTHESIS BI: CPT | Performed by: OBSTETRICS & GYNECOLOGY

## 2023-03-08 PROCEDURE — 77067 SCR MAMMO BI INCL CAD: CPT | Performed by: OBSTETRICS & GYNECOLOGY

## 2024-03-14 ENCOUNTER — HOSPITAL ENCOUNTER (OUTPATIENT)
Dept: MAMMOGRAPHY | Age: 49
Discharge: HOME | End: 2024-03-14
Attending: OBSTETRICS & GYNECOLOGY
Payer: COMMERCIAL

## 2024-03-14 DIAGNOSIS — Z12.31 SCREENING MAMMOGRAM FOR BREAST CANCER: ICD-10-CM

## 2024-03-14 PROCEDURE — 77067 SCR MAMMO BI INCL CAD: CPT | Performed by: OBSTETRICS & GYNECOLOGY

## 2024-03-14 PROCEDURE — 77063 BREAST TOMOSYNTHESIS BI: CPT | Performed by: OBSTETRICS & GYNECOLOGY

## 2024-04-03 PROBLEM — K62.89 ANORECTAL PAIN: Status: ACTIVE | Noted: 2024-04-03

## 2024-04-03 PROBLEM — R19.4 CHANGE IN BOWEL HABITS: Status: ACTIVE | Noted: 2024-04-03

## 2024-04-03 PROBLEM — K64.2 GRADE III INTERNAL HEMORRHOIDS: Status: ACTIVE | Noted: 2024-04-03

## 2024-07-08 ENCOUNTER — ORDER TRANSCRIPTION (OUTPATIENT)
Dept: ADMINISTRATIVE | Facility: HOSPITAL | Age: 49
End: 2024-07-08

## 2024-07-08 DIAGNOSIS — Z13.6 SCREENING FOR CARDIOVASCULAR CONDITION: Primary | ICD-10-CM

## 2024-07-26 ENCOUNTER — HOSPITAL ENCOUNTER (OUTPATIENT)
Dept: CV DIAGNOSTICS | Age: 49
Discharge: HOME | End: 2024-07-26
Attending: INTERNAL MEDICINE
Payer: COMMERCIAL

## 2024-07-26 DIAGNOSIS — Z82.49 FAMILY HISTORY OF CARDIOVASCULAR DISEASE: ICD-10-CM

## 2024-07-26 DIAGNOSIS — R53.83 FATIGUE: ICD-10-CM

## 2024-07-26 DIAGNOSIS — R06.09 DOE (DYSPNEA ON EXERTION): ICD-10-CM

## 2024-07-26 DIAGNOSIS — Z82.49 FAMILY HISTORY OF ARRHYTHMIA: ICD-10-CM

## 2024-07-26 DIAGNOSIS — I34.0 MITRAL REGURGITATION: ICD-10-CM

## 2024-07-26 PROCEDURE — 93306 TTE W/DOPPLER COMPLETE: CPT | Performed by: INTERNAL MEDICINE

## 2024-07-28 ENCOUNTER — HOSPITAL ENCOUNTER (OUTPATIENT)
Dept: CT IMAGING | Age: 49
Discharge: HOME | End: 2024-07-28
Attending: INTERNAL MEDICINE

## 2024-07-28 DIAGNOSIS — Z13.6 SCREENING FOR CARDIOVASCULAR CONDITION: ICD-10-CM

## 2024-08-01 ENCOUNTER — LAB ENCOUNTER (OUTPATIENT)
Dept: LAB | Age: 49
End: 2024-08-01
Attending: INTERNAL MEDICINE
Payer: COMMERCIAL

## 2024-08-01 DIAGNOSIS — E78.5 HYPERLIPEMIA: ICD-10-CM

## 2024-08-01 DIAGNOSIS — R06.09 DYSPNEA ON EXERTION: ICD-10-CM

## 2024-08-01 DIAGNOSIS — E55.9 VITAMIN D DEFICIENCY: ICD-10-CM

## 2024-08-01 DIAGNOSIS — E03.8 TSH (THYROID-STIMULATING HORMONE DEFICIENCY): Primary | ICD-10-CM

## 2024-08-01 DIAGNOSIS — E55.9 AVITAMINOSIS D: ICD-10-CM

## 2024-08-01 DIAGNOSIS — R53.83 FATIGUE: ICD-10-CM

## 2024-08-01 LAB
ALBUMIN SERPL-MCNC: 4.3 G/DL (ref 3.2–4.8)
ALBUMIN/GLOB SERPL: 1.7 {RATIO} (ref 1–2)
ALP LIVER SERPL-CCNC: 48 U/L
ALT SERPL-CCNC: 11 U/L
ANION GAP SERPL CALC-SCNC: 5 MMOL/L (ref 0–18)
AST SERPL-CCNC: 16 U/L (ref ?–34)
BASOPHILS # BLD AUTO: 0.04 X10(3) UL (ref 0–0.2)
BASOPHILS NFR BLD AUTO: 0.9 %
BILIRUB SERPL-MCNC: 1.4 MG/DL (ref 0.3–1.2)
BUN BLD-MCNC: 12 MG/DL (ref 9–23)
CALCIUM BLD-MCNC: 9.3 MG/DL (ref 8.7–10.4)
CHLORIDE SERPL-SCNC: 107 MMOL/L (ref 98–112)
CO2 SERPL-SCNC: 28 MMOL/L (ref 21–32)
CREAT BLD-MCNC: 0.71 MG/DL
CRP SERPL-MCNC: <0.4 MG/DL (ref ?–0.5)
EGFRCR SERPLBLD CKD-EPI 2021: 103 ML/MIN/1.73M2 (ref 60–?)
EOSINOPHIL # BLD AUTO: 0.07 X10(3) UL (ref 0–0.7)
EOSINOPHIL NFR BLD AUTO: 1.5 %
ERYTHROCYTE [DISTWIDTH] IN BLOOD BY AUTOMATED COUNT: 12.6 %
FASTING STATUS PATIENT QL REPORTED: YES
GLOBULIN PLAS-MCNC: 2.6 G/DL (ref 2–3.5)
GLUCOSE BLD-MCNC: 85 MG/DL (ref 70–99)
HCT VFR BLD AUTO: 38.1 %
HGB BLD-MCNC: 12.8 G/DL
IMM GRANULOCYTES # BLD AUTO: 0.01 X10(3) UL (ref 0–1)
IMM GRANULOCYTES NFR BLD: 0.2 %
LYMPHOCYTES # BLD AUTO: 1.75 X10(3) UL (ref 1–4)
LYMPHOCYTES NFR BLD AUTO: 37.6 %
MCH RBC QN AUTO: 29.7 PG (ref 26–34)
MCHC RBC AUTO-ENTMCNC: 33.6 G/DL (ref 31–37)
MCV RBC AUTO: 88.4 FL
MONOCYTES # BLD AUTO: 0.41 X10(3) UL (ref 0.1–1)
MONOCYTES NFR BLD AUTO: 8.8 %
NEUTROPHILS # BLD AUTO: 2.38 X10 (3) UL (ref 1.5–7.7)
NEUTROPHILS # BLD AUTO: 2.38 X10(3) UL (ref 1.5–7.7)
NEUTROPHILS NFR BLD AUTO: 51 %
NT-PROBNP SERPL-MCNC: 81 PG/ML (ref ?–125)
OSMOLALITY SERPL CALC.SUM OF ELEC: 289 MOSM/KG (ref 275–295)
PLATELET # BLD AUTO: 240 10(3)UL (ref 150–450)
POTASSIUM SERPL-SCNC: 4 MMOL/L (ref 3.5–5.1)
PROT SERPL-MCNC: 6.9 G/DL (ref 5.7–8.2)
RBC # BLD AUTO: 4.31 X10(6)UL
SODIUM SERPL-SCNC: 140 MMOL/L (ref 136–145)
VIT D+METAB SERPL-MCNC: 60.1 NG/ML (ref 30–100)
WBC # BLD AUTO: 4.7 X10(3) UL (ref 4–11)

## 2024-08-01 PROCEDURE — 82306 VITAMIN D 25 HYDROXY: CPT

## 2024-08-01 PROCEDURE — 85025 COMPLETE CBC W/AUTO DIFF WBC: CPT

## 2024-08-01 PROCEDURE — 36415 COLL VENOUS BLD VENIPUNCTURE: CPT

## 2024-08-01 PROCEDURE — 86140 C-REACTIVE PROTEIN: CPT

## 2024-08-01 PROCEDURE — 83880 ASSAY OF NATRIURETIC PEPTIDE: CPT

## 2024-08-01 PROCEDURE — 80053 COMPREHEN METABOLIC PANEL: CPT

## 2024-08-01 PROCEDURE — 83695 ASSAY OF LIPOPROTEIN(A): CPT

## 2024-08-02 LAB — LIPOPROTEIN (A): 36.1 NMOL/L

## 2024-12-19 ENCOUNTER — APPOINTMENT (OUTPATIENT)
Dept: GENERAL RADIOLOGY | Age: 49
End: 2024-12-19
Payer: COMMERCIAL

## 2024-12-19 ENCOUNTER — HOSPITAL ENCOUNTER (EMERGENCY)
Age: 49
Discharge: HOME | End: 2024-12-19
Attending: EMERGENCY MEDICINE
Payer: COMMERCIAL

## 2024-12-19 VITALS
TEMPERATURE: 98 F | HEIGHT: 67 IN | BODY MASS INDEX: 26.06 KG/M2 | WEIGHT: 166 LBS | HEART RATE: 95 BPM | SYSTOLIC BLOOD PRESSURE: 150 MMHG | OXYGEN SATURATION: 97 % | DIASTOLIC BLOOD PRESSURE: 82 MMHG | RESPIRATION RATE: 18 BRPM

## 2024-12-19 DIAGNOSIS — R07.89 CHEST TIGHTNESS: Primary | ICD-10-CM

## 2024-12-19 LAB
POCT INFLUENZA A: NEGATIVE
POCT INFLUENZA B: NEGATIVE
SARS-COV-2 RNA RESP QL NAA+PROBE: NOT DETECTED

## 2024-12-19 PROCEDURE — 99284 EMERGENCY DEPT VISIT MOD MDM: CPT

## 2024-12-19 PROCEDURE — 87502 INFLUENZA DNA AMP PROBE: CPT

## 2024-12-19 PROCEDURE — 93005 ELECTROCARDIOGRAM TRACING: CPT

## 2024-12-19 PROCEDURE — 87502 INFLUENZA DNA AMP PROBE: CPT | Performed by: EMERGENCY MEDICINE

## 2024-12-19 PROCEDURE — 93010 ELECTROCARDIOGRAM REPORT: CPT

## 2024-12-19 PROCEDURE — 71046 X-RAY EXAM CHEST 2 VIEWS: CPT

## 2024-12-19 RX ORDER — NITROFURANTOIN 25; 75 MG/1; MG/1
100 CAPSULE ORAL 2 TIMES DAILY
COMMUNITY

## 2024-12-20 LAB
ATRIAL RATE: 89 BPM
P AXIS: 37 DEGREES
P-R INTERVAL: 134 MS
Q-T INTERVAL: 366 MS
QRS DURATION: 82 MS
QTC CALCULATION (BEZET): 445 MS
R AXIS: 50 DEGREES
T AXIS: 54 DEGREES
VENTRICULAR RATE: 89 BPM

## 2024-12-20 NOTE — DISCHARGE INSTRUCTIONS
You were seen in the emergency department.  Your EKG and chest x-ray were reassuring.  You tested negative for COVID and flu.  Return to the ER if you develop worsening chest pain, shortness of breath or difficulty breathing, lightheadedness, or other new concerns or worsening symptoms.  Please follow-up closely with your primary care physician.

## 2024-12-20 NOTE — ED PROVIDER NOTES
Patient Seen in: ward Emergency Department In [place]      History     Chief Complaint   Patient presents with    Difficulty Breathing     Stated Complaint: chest tightness on antibitics UTI    Subjective:   HPI  Patient is a 49-year-old female who presents to the ED for evaluation of 2 episodes of chest tightness today.  Patient states that she was diagnosed with a UTI and started on antibiotics today.  She reports that she had an episode this morning at rest lasting for about 15 minutes after drinking from a straw.  This was prior to having her first dose of antibiotics.  She felt fine throughout the day and then had another episode after drinking with a straw and taking first dose of antibiotics this evening lasting about 15 minutes.  She denies any fevers, cough, shortness of breath, diaphoresis, dizziness, nausea, vomiting, abdominal pain.  Denies any history of blood clots, recent travel or surgeries, hemoptysis, leg swelling.  She denies any cardiac history.  Patient is currently asymptomatic.  Patient has history of similar episodes in the past which she attributes to mold exposure and environmental factors.  Patient states that she thinks that her symptoms could have been related to drinking from the straw she was using in same cup that has been out  all day.    From chart review, patient was seen at PCP office on 12/18/2024 and diagnosed with UTI.  She was prescribed both Bactrim and Macrobid because she is going out of the country tomorrow in case Macrobid does not work.  Patient then went to immediate care  earlier today for small rash on left flank and great toenail and fungal infection.      Objective:     Past Medical History:    Abdominal distention    when eating too many carbs    Abdominal hernia    hiatal    Abdominal pain    Not so much as pain, but feel stomach drop when roll over at night    Acne    Anemia    Arthritis    middle finger, left hand, injured    Back pain    from poor  posture    Belching    Chest pain    from heartburn    Chronic rhinitis    Colon polyps    Miguel Angel    Concussion    Constipation    Diverticulosis    Miguel Angel    Dysmenorrhea    when not on oc    Feeling lonely    prior to COVID lockdown, felt very lonely    Food intolerance    some foods cause diarrhea    H/O pregnancy        Heart palpitations    off and on, often after eating, seems to be gone now    Heartburn    Hemorrhoids    when constipated, only occassionally    Hiatal hernia    Miguel Angel    High cholesterol    261 total, 172 ldl, 49 hdl, triglycerides 201    History of cardiac murmur    mitral valve regurg.    IBS (irritable bowel syndrome)    Indigestion    Irregular bowel habits    stools are looser and flat    Leaking of urine    occasionally when cough or sneeze    Mitral valve disorders(424.0)    regurg    Pain in joints    middle finger, left hand, injured    Pain with bowel movements    when constipated, only occassionally    Pap smear for cervical cancer screening    wnl neg hpv     Skin blushing/flushing    when drink some alcohols    Sleep apnea    unsure,  says I wake myself up from snoring    Sleep disturbance    from heartburn    Wears glasses              Past Surgical History:   Procedure Laterality Date    Colonoscopy  04/10/2015    Colonoscopy & polypectomy  04/10/2015    Miguel Angel; rpt 5 yrs    D & c      D&c after delivery  2010    MISCARRIAGE          Upper gi endoscopy performed  04/10/2015    Miguel Angel                Social History     Socioeconomic History    Marital status:    Tobacco Use    Smoking status: Never    Smokeless tobacco: Never   Vaping Use    Vaping status: Never Used   Substance and Sexual Activity    Alcohol use: Yes     Alcohol/week: 3.0 standard drinks of alcohol     Comment: very rarely drink - maybe one drink every 3 months    Drug use: Never    Sexual activity: Yes     Partners: Male   Other Topics Concern      Service No    Blood Transfusions No    Caffeine Concern Yes     Comment: 1-2 iced tea x day    Occupational Exposure No    Hobby Hazards No    Sleep Concern No    Stress Concern No    Weight Concern Yes    Special Diet Yes     Comment: CALORIIE COUNTING    Back Care No    Exercise Yes     Comment: 5-6 x week, walk    Bike Helmet No    Seat Belt Yes    Self-Exams Yes                  Physical Exam     ED Triage Vitals [12/19/24 2046]   /61   Pulse 85   Resp 18   Temp 98.4 °F (36.9 °C)   Temp src Oral   SpO2 98 %   O2 Device None (Room air)       Current Vitals:   Vital Signs  BP: 150/82  Pulse: 95  Resp: 18  Temp: 98.4 °F (36.9 °C)  Temp src: Oral    Oxygen Therapy  SpO2: 97 %  O2 Device: None (Room air)        Physical Exam  Vitals and nursing note reviewed.   Constitutional:       General: She is not in acute distress.     Appearance: She is not ill-appearing.   HENT:      Head: Normocephalic and atraumatic.      Mouth/Throat:      Mouth: Mucous membranes are moist.   Eyes:      Extraocular Movements: Extraocular movements intact.      Pupils: Pupils are equal, round, and reactive to light.   Cardiovascular:      Rate and Rhythm: Normal rate and regular rhythm.   Pulmonary:      Effort: Pulmonary effort is normal. No respiratory distress.      Breath sounds: Normal breath sounds. No wheezing.   Chest:      Chest wall: No tenderness.      Comments: No chest wall rash  Abdominal:      General: There is no distension.      Palpations: Abdomen is soft.      Tenderness: There is no abdominal tenderness.   Musculoskeletal:      Cervical back: Neck supple.      Right lower leg: No edema.      Left lower leg: No edema.   Skin:     General: Skin is warm and dry.      Capillary Refill: Capillary refill takes less than 2 seconds.   Neurological:      General: No focal deficit present.      Mental Status: She is alert.   Psychiatric:         Mood and Affect: Mood normal.             ED Course     Labs Reviewed    RAPID SARS-COV-2 BY PCR - Normal   POCT FLU TEST - Normal    Narrative:     This assay is a rapid molecular in vitro test utilizing nucleic acid amplification of influenza A and B viral RNA.     EKG    Rate, intervals and axes as noted on EKG Report.  Rate: 89  Rhythm: Sinus Rhythm  Reading: Normal sinus rhythm with ventricular rate of 89, no acute ST elevations or depressions, normal intervals.  Similar to prior.                     MDM      Patient is a 49-year-old female who presents to the ED for evaluation of 2 episodes of chest tightness today.  Vitals are stable.  On exam patient is very well-appearing.  Lungs are clear bilaterally.  No peripheral edema.  Patient is currently asymptomatic.    Differential diagnosis includes but not limited to ACS, atypical chest pain, musculoskeletal pain, anxiety, GERD, environmental reaction.    Initial work up obtained from triage. Chest x-ray independently reviewed and shows no acute process.  COVID and flu negative.  EKG is normal sinus rhythm, nonischemic.    Labs were ordered.  However upon my evaluation, patient would prefer to be discharged home without labs or further testing. I did recommend obtaining labs however patient so wish to hold off as she feels that the symptoms she has had in the past are very similar to when she has been exposed to mold/environmental factors.  Patient said that she just wanted to make sure that she was okay as she is leaving to go out of the country tomorrow.  Patient is overall low risk for PE using Wells criteria and I have low suspicion for PE based off of presentation.  Patient is overall low risk for ACS as well and EKG is nonischemic. I had discussion regarding the purpose of obtaining labs and patient would still like to hold off. She demonstrated clear understanding.     I discussed strict return precautions and recommended close follow-up with PCP.  Patient verbalized understanding and agreement of plan.        Medical  Decision Making  Amount and/or Complexity of Data Reviewed  External Data Reviewed: notes.  Radiology: ordered and independent interpretation performed. Decision-making details documented in ED Course.  ECG/medicine tests: ordered and independent interpretation performed. Decision-making details documented in ED Course.        Disposition and Plan     Clinical Impression:  1. Chest tightness         Disposition:  Discharge  12/19/2024 10:41 pm    Follow-up:  Gideon Zapata MD  [address] [address]  [phone]    Schedule an appointment as soon as possible for a visit in 1 day(s)            Medications Prescribed:  Discharge Medication List as of 12/19/2024 10:43 PM              Supplementary Documentation:

## 2025-03-17 ENCOUNTER — HOSPITAL ENCOUNTER (OUTPATIENT)
Dept: MAMMOGRAPHY | Age: 50
Discharge: HOME | End: 2025-03-17
Attending: OBSTETRICS & GYNECOLOGY
Payer: COMMERCIAL

## 2025-03-17 DIAGNOSIS — Z12.31 ENCOUNTER FOR SCREENING MAMMOGRAM FOR MALIGNANT NEOPLASM OF BREAST: ICD-10-CM

## 2025-03-17 PROCEDURE — 77067 SCR MAMMO BI INCL CAD: CPT | Performed by: OBSTETRICS & GYNECOLOGY

## 2025-03-17 PROCEDURE — 77063 BREAST TOMOSYNTHESIS BI: CPT | Performed by: OBSTETRICS & GYNECOLOGY

## (undated) NOTE — MR AVS SNAPSHOT
After Visit Summary   11/18/2021    Trini Hung   MRN: [id number]           Visit Information     Date & Time  11/18/2021 11:30 AM Provider  Asia Armstrong MD Department  [facility] [address] [address] [facility]  Phone  [phone] VELMA PERRY 2D+3D SCREENING BILAT (CPT=[id number])    Instructions: Your order will generate a \"Scheduling Ticket\" that will be available in [employer] to schedule on your own at a time most convenient to you.       If you do not have a [employer] Account, or i